# Patient Record
Sex: MALE | Race: WHITE
[De-identification: names, ages, dates, MRNs, and addresses within clinical notes are randomized per-mention and may not be internally consistent; named-entity substitution may affect disease eponyms.]

---

## 2018-09-05 ENCOUNTER — CHARTING TRANS (OUTPATIENT)
Dept: OTHER | Age: 53
End: 2018-09-05

## 2018-09-05 ASSESSMENT — PAIN SCALES - GENERAL: PAINLEVEL_OUTOF10: 4

## 2018-12-08 VITALS
BODY MASS INDEX: 37.56 KG/M2 | TEMPERATURE: 98.2 F | WEIGHT: 283.41 LBS | DIASTOLIC BLOOD PRESSURE: 78 MMHG | HEART RATE: 78 BPM | SYSTOLIC BLOOD PRESSURE: 122 MMHG | HEIGHT: 73 IN

## 2021-02-02 ENCOUNTER — OFFICE VISIT (OUTPATIENT)
Dept: ORTHOPEDICS CLINIC | Facility: CLINIC | Age: 56
End: 2021-02-02
Payer: COMMERCIAL

## 2021-02-02 DIAGNOSIS — M23.306 DEGENERATIVE TEAR OF MENISCUS OF RIGHT KNEE: ICD-10-CM

## 2021-02-02 DIAGNOSIS — S83.209A ACUTE TORN MENISCUS: Primary | ICD-10-CM

## 2021-02-02 DIAGNOSIS — M17.11 PRIMARY OSTEOARTHRITIS OF RIGHT KNEE: ICD-10-CM

## 2021-02-02 PROCEDURE — 99212 OFFICE O/P EST SF 10 MIN: CPT | Performed by: ORTHOPAEDIC SURGERY

## 2021-02-02 NOTE — PROGRESS NOTES
Patient is a 49-year-old white male here for evaluation of his right knee. He has had a diagnosis of a possible medial meniscus tear of his right knee but also some chondromalacia of the medial femoral condyle.   He also has some chondromalacia of the lópez

## 2021-02-10 DIAGNOSIS — Z23 NEED FOR VACCINATION: ICD-10-CM

## 2021-02-11 ENCOUNTER — IMMUNIZATION (OUTPATIENT)
Dept: LAB | Age: 56
End: 2021-02-11
Attending: HOSPITALIST
Payer: COMMERCIAL

## 2021-02-11 DIAGNOSIS — Z23 NEED FOR VACCINATION: Primary | ICD-10-CM

## 2021-02-11 PROCEDURE — 0001A SARSCOV2 VAC 30MCG/0.3ML IM: CPT

## 2021-03-02 ENCOUNTER — OFFICE VISIT (OUTPATIENT)
Dept: ORTHOPEDICS CLINIC | Facility: CLINIC | Age: 56
End: 2021-03-02
Payer: COMMERCIAL

## 2021-03-02 DIAGNOSIS — M94.261 CHONDROMALACIA OF RIGHT KNEE: ICD-10-CM

## 2021-03-02 DIAGNOSIS — S83.241D TEAR OF MEDIAL MENISCUS OF RIGHT KNEE, CURRENT, UNSPECIFIED TEAR TYPE, SUBSEQUENT ENCOUNTER: Primary | ICD-10-CM

## 2021-03-02 DIAGNOSIS — S83.242A OTHER TEAR OF MEDIAL MENISCUS OF LEFT KNEE, UNSPECIFIED WHETHER OLD OR CURRENT TEAR, INITIAL ENCOUNTER: ICD-10-CM

## 2021-03-02 PROCEDURE — 99213 OFFICE O/P EST LOW 20 MIN: CPT | Performed by: ORTHOPAEDIC SURGERY

## 2021-03-02 NOTE — PROGRESS NOTES
Patient is a 68-year-old white male here for follow-up. Patient has had a diagnosis of medial meniscus tear of his knees bilaterally. Also chondromalacia of the medial femoral condyle and patellofemoral joints.   Patient had an MRI scan of his right knee

## 2021-03-04 ENCOUNTER — IMMUNIZATION (OUTPATIENT)
Dept: LAB | Age: 56
End: 2021-03-04
Attending: HOSPITALIST
Payer: COMMERCIAL

## 2021-03-04 DIAGNOSIS — Z23 NEED FOR VACCINATION: Primary | ICD-10-CM

## 2021-03-04 PROCEDURE — 0002A SARSCOV2 VAC 30MCG/0.3ML IM: CPT

## 2021-03-16 ENCOUNTER — OFFICE VISIT (OUTPATIENT)
Dept: ORTHOPEDICS CLINIC | Facility: CLINIC | Age: 56
End: 2021-03-16
Payer: COMMERCIAL

## 2021-03-16 DIAGNOSIS — M17.0 PRIMARY OSTEOARTHRITIS OF BOTH KNEES: Primary | ICD-10-CM

## 2021-03-16 PROCEDURE — 99214 OFFICE O/P EST MOD 30 MIN: CPT | Performed by: ORTHOPAEDIC SURGERY

## 2021-03-16 PROCEDURE — 20610 DRAIN/INJ JOINT/BURSA W/O US: CPT | Performed by: ORTHOPAEDIC SURGERY

## 2021-03-16 RX ORDER — TRIAMCINOLONE ACETONIDE 40 MG/ML
40 INJECTION, SUSPENSION INTRA-ARTICULAR; INTRAMUSCULAR ONCE
Status: COMPLETED | OUTPATIENT
Start: 2021-03-16 | End: 2021-03-16

## 2021-03-16 RX ADMIN — TRIAMCINOLONE ACETONIDE 40 MG: 40 INJECTION, SUSPENSION INTRA-ARTICULAR; INTRAMUSCULAR at 18:26:00

## 2021-03-16 RX ADMIN — TRIAMCINOLONE ACETONIDE 40 MG: 40 INJECTION, SUSPENSION INTRA-ARTICULAR; INTRAMUSCULAR at 18:25:00

## 2021-03-16 NOTE — PROGRESS NOTES
Patient is a 61-year-old white male here for follow-up. He has had a diagnosis of degenerative arthritis of his knees.   I did order MRI scans of his knees to ascertain whether unicondylar arthroplasties would be a consideration and also whether an arthros

## 2021-03-24 ENCOUNTER — TELEPHONE (OUTPATIENT)
Dept: ORTHOPEDICS CLINIC | Facility: CLINIC | Age: 56
End: 2021-03-24

## 2021-03-25 NOTE — TELEPHONE ENCOUNTER
Patient called to just check surgery schedule for his LT Knee Replacement maybe sometime in June of this year. Patient would like a call back. Thanks.     Patient can be reached at [phone]

## 2021-03-26 ENCOUNTER — TELEPHONE (OUTPATIENT)
Dept: ORTHOPEDICS CLINIC | Facility: CLINIC | Age: 56
End: 2021-03-26

## 2021-03-26 NOTE — TELEPHONE ENCOUNTER
Surgeon: Chase De La Rosa  Date of Surgery: 6-14-21  Facility: [facility]       If [facility], scheduling sheet to referral team?: YES  Clearance needed: YES       If yes, requested?: YES  Post-op Appt: 6-29-21 1:40

## 2021-05-04 ENCOUNTER — OFFICE VISIT (OUTPATIENT)
Dept: ORTHOPEDICS CLINIC | Facility: CLINIC | Age: 56
End: 2021-05-04
Payer: COMMERCIAL

## 2021-05-04 VITALS — HEIGHT: 74 IN | WEIGHT: 295 LBS | BODY MASS INDEX: 37.86 KG/M2

## 2021-05-04 DIAGNOSIS — M17.12 PRIMARY OSTEOARTHRITIS OF LEFT KNEE: Primary | ICD-10-CM

## 2021-05-04 PROCEDURE — 99212 OFFICE O/P EST SF 10 MIN: CPT | Performed by: ORTHOPAEDIC SURGERY

## 2021-05-04 PROCEDURE — 20610 DRAIN/INJ JOINT/BURSA W/O US: CPT | Performed by: ORTHOPAEDIC SURGERY

## 2021-05-04 PROCEDURE — 3008F BODY MASS INDEX DOCD: CPT | Performed by: ORTHOPAEDIC SURGERY

## 2021-05-04 RX ORDER — TRIAMCINOLONE ACETONIDE 40 MG/ML
40 INJECTION, SUSPENSION INTRA-ARTICULAR; INTRAMUSCULAR ONCE
Status: COMPLETED | OUTPATIENT
Start: 2021-05-04 | End: 2021-05-04

## 2021-05-04 RX ADMIN — TRIAMCINOLONE ACETONIDE 40 MG: 40 INJECTION, SUSPENSION INTRA-ARTICULAR; INTRAMUSCULAR at 15:09:00

## 2021-05-04 NOTE — PROGRESS NOTES
Patient is a 42-year-old white male here for follow-up. He has had a diagnosis of degenerative arthritis of both knees. He is scheduled for surgery in a little over a month regarding the left knee. That would be a left total knee arthroplasty.   Patient

## 2021-05-27 ENCOUNTER — TELEPHONE (OUTPATIENT)
Dept: ORTHOPEDICS CLINIC | Facility: CLINIC | Age: 56
End: 2021-05-27

## 2021-05-27 DIAGNOSIS — Z96.652 HISTORY OF TOTAL KNEE ARTHROPLASTY, LEFT: Primary | ICD-10-CM

## 2021-05-28 ENCOUNTER — TELEPHONE (OUTPATIENT)
Dept: ORTHOPEDICS CLINIC | Facility: CLINIC | Age: 56
End: 2021-05-28

## 2021-06-01 ENCOUNTER — MED REC SCAN ONLY (OUTPATIENT)
Dept: ORTHOPEDICS CLINIC | Facility: CLINIC | Age: 56
End: 2021-06-01

## 2021-06-08 ENCOUNTER — OFFICE VISIT (OUTPATIENT)
Dept: ORTHOPEDICS CLINIC | Facility: CLINIC | Age: 56
End: 2021-06-08
Payer: COMMERCIAL

## 2021-06-08 VITALS — BODY MASS INDEX: 41.75 KG/M2 | HEIGHT: 73 IN | WEIGHT: 315 LBS

## 2021-06-08 DIAGNOSIS — M17.12 PRIMARY OSTEOARTHRITIS OF LEFT KNEE: Primary | ICD-10-CM

## 2021-06-08 PROCEDURE — 3008F BODY MASS INDEX DOCD: CPT | Performed by: ORTHOPAEDIC SURGERY

## 2021-06-08 PROCEDURE — 99213 OFFICE O/P EST LOW 20 MIN: CPT | Performed by: ORTHOPAEDIC SURGERY

## 2021-06-08 NOTE — PROGRESS NOTES
Patient is a 54-year-old white male here for follow-up. He has had a diagnosis of degenerative arthritis of his knees bilaterally. He has had predominant pain with the left knee. He is scheduled for a total knee arthroplasty next week.   Patient is here

## 2021-06-10 ENCOUNTER — TELEPHONE (OUTPATIENT)
Dept: ORTHOPEDICS CLINIC | Facility: CLINIC | Age: 56
End: 2021-06-10

## 2021-06-10 NOTE — TELEPHONE ENCOUNTER
PT order for S/P left total knee arthroplasty faxed to Tania Hernandez, Nurse  at [facility]. Faxed to [phone]. Fax confirmation received.

## 2021-06-18 ENCOUNTER — TELEPHONE (OUTPATIENT)
Dept: ORTHOPEDICS CLINIC | Facility: CLINIC | Age: 56
End: 2021-06-18

## 2021-06-18 NOTE — TELEPHONE ENCOUNTER
Spoke with patient and notified him that he can resume his vitamins/supplements now since he is post op. Post op Appt confirmed with patient. Pt verbalized understanding. No further questions at this time.

## 2021-06-18 NOTE — TELEPHONE ENCOUNTER
Patient is looking for advise on when he can resume taking his Magnesium and Multivitamin supplments. Please advise.

## 2021-06-23 RX ORDER — HYDROCODONE BITARTRATE AND ACETAMINOPHEN 10; 325 MG/1; MG/1
1 TABLET ORAL EVERY 6 HOURS PRN
Qty: 30 TABLET | Refills: 0 | Status: SHIPPED | OUTPATIENT
Start: 2021-06-23 | End: 2021-07-13

## 2021-06-23 NOTE — TELEPHONE ENCOUNTER
LOV: 6/8/21 Dr Hoa Oropeza  Date of Surgery: 6/14/21  Dispensed Written Strength Quantity Refills Days Supply Provider Pharmacy    HYDROCO/APAP TAB 10-325MG 06/16/2021 06/15/2021  30 tablet  [address] ..        Future Appointments

## 2021-06-29 ENCOUNTER — OFFICE VISIT (OUTPATIENT)
Dept: ORTHOPEDICS CLINIC | Facility: CLINIC | Age: 56
End: 2021-06-29
Payer: COMMERCIAL

## 2021-06-29 VITALS — BODY MASS INDEX: 37.22 KG/M2 | HEIGHT: 74 IN | WEIGHT: 290 LBS

## 2021-06-29 DIAGNOSIS — Z48.89 AFTERCARE FOLLOWING SURGERY: Primary | ICD-10-CM

## 2021-06-29 PROCEDURE — 3008F BODY MASS INDEX DOCD: CPT | Performed by: ORTHOPAEDIC SURGERY

## 2021-06-29 PROCEDURE — 99024 POSTOP FOLLOW-UP VISIT: CPT | Performed by: ORTHOPAEDIC SURGERY

## 2021-06-29 NOTE — PROGRESS NOTES
Patient is a 51-year-old white male here for follow-up. Patient had the left total knee arthroplasty performed about 2 weeks ago. His female  is with him today. Patient states he is overall doing fairly well.   Having some stiffness and soreness

## 2021-06-30 ENCOUNTER — PATIENT MESSAGE (OUTPATIENT)
Dept: ORTHOPEDICS CLINIC | Facility: CLINIC | Age: 56
End: 2021-06-30

## 2021-06-30 ENCOUNTER — TELEPHONE (OUTPATIENT)
Dept: ORTHOPEDICS CLINIC | Facility: CLINIC | Age: 56
End: 2021-06-30

## 2021-06-30 RX ORDER — CEPHALEXIN 500 MG/1
CAPSULE ORAL
Qty: 4 CAPSULE | Refills: 2 | Status: SHIPPED | OUTPATIENT
Start: 2021-06-30

## 2021-06-30 NOTE — TELEPHONE ENCOUNTER
Spoke with patient and notified him that the antibiotic prescription was sent to his pharmacy.  Pt stated he will pick that up before his dental exam. Pt stated when he got home yesterday, one of the steri strips came off of the incision, and the wound star

## 2021-06-30 NOTE — TELEPHONE ENCOUNTER
Ethan Carrera MD 1 minute ago (2:08 PM)   RG  yes a litte discharge yellowish and a little blood.   No fever    Future Appointments   Date Time Provider Roverto Holden   7/13/2021 10:00 AM Lucy Bradley MD [facility]

## 2021-06-30 NOTE — TELEPHONE ENCOUNTER
Patient called us to let us know that he as a dentist appointment on 7/7 for a routine cleaning. Patient would like to know if he needs clearance and antibiotics before going to dentist appointment due to recent surgery. Please advise.

## 2021-07-01 ENCOUNTER — TELEPHONE (OUTPATIENT)
Dept: ORTHOPEDICS CLINIC | Facility: CLINIC | Age: 56
End: 2021-07-01

## 2021-07-01 DIAGNOSIS — Z48.89 AFTERCARE FOLLOWING SURGERY: ICD-10-CM

## 2021-07-01 DIAGNOSIS — T81.49XA WOUND CELLULITIS AFTER SURGERY: Primary | ICD-10-CM

## 2021-07-01 RX ORDER — CEFADROXIL 500 MG/1
500 CAPSULE ORAL 2 TIMES DAILY
Qty: 10 CAPSULE | Refills: 0 | Status: SHIPPED | OUTPATIENT
Start: 2021-07-01 | End: 2021-07-06

## 2021-07-01 NOTE — TELEPHONE ENCOUNTER
Patient informed of MD recommendations below, patient verbalized understanding with intent to comply. Offered opportunity to ask questions, all questions were answered.

## 2021-07-02 NOTE — TELEPHONE ENCOUNTER
Patient with yellow drainage from left knee wound for past day. Denies fever. Now 2 plus weeks after left TKA. Exam shows minimal erythema distal left knee wound. Minimal serous drainage.      Impression is that of mild cellulitis left knee post op left

## 2021-07-13 ENCOUNTER — OFFICE VISIT (OUTPATIENT)
Dept: ORTHOPEDICS CLINIC | Facility: CLINIC | Age: 56
End: 2021-07-13
Payer: COMMERCIAL

## 2021-07-13 DIAGNOSIS — Z96.652 HISTORY OF TOTAL KNEE ARTHROPLASTY, LEFT: ICD-10-CM

## 2021-07-13 DIAGNOSIS — M70.62 TROCHANTERIC BURSITIS OF LEFT HIP: ICD-10-CM

## 2021-07-13 DIAGNOSIS — Z48.89 AFTERCARE FOLLOWING SURGERY: Primary | ICD-10-CM

## 2021-07-13 PROCEDURE — 99024 POSTOP FOLLOW-UP VISIT: CPT | Performed by: ORTHOPAEDIC SURGERY

## 2021-07-13 NOTE — PROGRESS NOTES
Patient is a 60-year-old white male here for follow-up. Patient had the left total knee arthroplasty performed approximately 4 weeks ago. He is here with his wife for follow-up.   Overall he is doing fairly well but complained of some soreness of the late

## 2021-08-11 ENCOUNTER — HOSPITAL ENCOUNTER (OUTPATIENT)
Dept: GENERAL RADIOLOGY | Age: 56
Discharge: HOME | End: 2021-08-11
Attending: ORTHOPAEDIC SURGERY
Payer: COMMERCIAL

## 2021-08-11 DIAGNOSIS — Z01.89 ENCOUNTER FOR LOWER EXTREMITY COMPARISON IMAGING STUDY: ICD-10-CM

## 2021-08-11 DIAGNOSIS — Z48.89 AFTERCARE FOLLOWING SURGERY: ICD-10-CM

## 2021-08-11 DIAGNOSIS — Z96.652 HISTORY OF TOTAL KNEE ARTHROPLASTY, LEFT: ICD-10-CM

## 2021-08-11 PROCEDURE — 73562 X-RAY EXAM OF KNEE 3: CPT | Performed by: ORTHOPAEDIC SURGERY

## 2021-08-11 PROCEDURE — 73560 X-RAY EXAM OF KNEE 1 OR 2: CPT | Performed by: ORTHOPAEDIC SURGERY

## 2021-08-17 ENCOUNTER — OFFICE VISIT (OUTPATIENT)
Dept: ORTHOPEDICS CLINIC | Facility: CLINIC | Age: 56
End: 2021-08-17
Payer: COMMERCIAL

## 2021-08-17 DIAGNOSIS — Z48.89 AFTERCARE FOLLOWING SURGERY: Primary | ICD-10-CM

## 2021-08-17 DIAGNOSIS — M17.11 PRIMARY OSTEOARTHRITIS OF RIGHT KNEE: ICD-10-CM

## 2021-08-17 DIAGNOSIS — Z96.652 HISTORY OF TOTAL KNEE ARTHROPLASTY, LEFT: ICD-10-CM

## 2021-08-17 PROCEDURE — 99024 POSTOP FOLLOW-UP VISIT: CPT | Performed by: ORTHOPAEDIC SURGERY

## 2021-08-17 NOTE — PROGRESS NOTES
Patient is a 24-year-old white male now approximately 8 weeks out from his surgery. He had a left total knee arthroplasty performed. Patient states he still feels weakness post surgery.  Patient states he has some difficulties getting up off the floor or ge

## 2021-08-30 ENCOUNTER — TELEPHONE (OUTPATIENT)
Dept: ORTHOPEDICS CLINIC | Facility: CLINIC | Age: 56
End: 2021-08-30

## 2021-08-30 NOTE — TELEPHONE ENCOUNTER
Patient called requesting to speak with Dr. Marisol Hughes regarding going back to work this Wednesday 9/1/21. Patient states that he received some paperwork releasing him to go back to work , however would like to talk to Dr. Marisol Hughes before he goes back.     Pl

## 2021-09-14 ENCOUNTER — OFFICE VISIT (OUTPATIENT)
Dept: ORTHOPEDICS CLINIC | Facility: CLINIC | Age: 56
End: 2021-09-14
Payer: COMMERCIAL

## 2021-09-14 DIAGNOSIS — Z48.89 AFTERCARE FOLLOWING SURGERY: Primary | ICD-10-CM

## 2021-09-14 DIAGNOSIS — Z96.652 HISTORY OF TOTAL KNEE ARTHROPLASTY, LEFT: ICD-10-CM

## 2021-09-14 PROCEDURE — 99024 POSTOP FOLLOW-UP VISIT: CPT | Performed by: ORTHOPAEDIC SURGERY

## 2021-09-14 NOTE — PROGRESS NOTES
Lift something or withPatient is a 42-year-old white male here for follow-up. He is about 3 months out from his left total knee arthroplasty. Patient's complained of some mild pain of the knee. Patient states he was pivoting he tweaked the knee.   States

## 2021-12-14 ENCOUNTER — OFFICE VISIT (OUTPATIENT)
Dept: ORTHOPEDICS CLINIC | Facility: CLINIC | Age: 56
End: 2021-12-14
Payer: COMMERCIAL

## 2021-12-14 ENCOUNTER — HOSPITAL ENCOUNTER (OUTPATIENT)
Dept: GENERAL RADIOLOGY | Facility: HOSPITAL | Age: 56
Discharge: HOME | End: 2021-12-14
Attending: ORTHOPAEDIC SURGERY
Payer: COMMERCIAL

## 2021-12-14 VITALS — WEIGHT: 295 LBS | BODY MASS INDEX: 37.86 KG/M2 | HEIGHT: 74 IN

## 2021-12-14 DIAGNOSIS — Z96.652 HISTORY OF TOTAL KNEE ARTHROPLASTY, LEFT: Primary | ICD-10-CM

## 2021-12-14 DIAGNOSIS — Z96.652 HISTORY OF TOTAL KNEE ARTHROPLASTY, LEFT: ICD-10-CM

## 2021-12-14 DIAGNOSIS — Z48.89 AFTERCARE FOLLOWING SURGERY: ICD-10-CM

## 2021-12-14 PROCEDURE — 3008F BODY MASS INDEX DOCD: CPT | Performed by: ORTHOPAEDIC SURGERY

## 2021-12-14 PROCEDURE — 73560 X-RAY EXAM OF KNEE 1 OR 2: CPT | Performed by: ORTHOPAEDIC SURGERY

## 2021-12-14 PROCEDURE — 99213 OFFICE O/P EST LOW 20 MIN: CPT | Performed by: ORTHOPAEDIC SURGERY

## 2021-12-14 RX ORDER — BIOTIN 1 MG
1 TABLET ORAL DAILY
COMMUNITY

## 2021-12-14 NOTE — PROGRESS NOTES
Patient is a 19-year-old white male here for follow-up on his left total knee arthroplasty. Patient did have a fall few days ago and injured his left knee. Patient states is feeling better.   Patient's total knee arthroplasty was performed approximately 6

## 2022-06-14 ENCOUNTER — HOSPITAL ENCOUNTER (OUTPATIENT)
Dept: GENERAL RADIOLOGY | Age: 57
Discharge: HOME | End: 2022-06-14
Attending: ORTHOPAEDIC SURGERY
Payer: COMMERCIAL

## 2022-06-14 ENCOUNTER — OFFICE VISIT (OUTPATIENT)
Dept: ORTHOPEDICS CLINIC | Facility: CLINIC | Age: 57
End: 2022-06-14
Payer: COMMERCIAL

## 2022-06-14 VITALS — BODY MASS INDEX: 37.86 KG/M2 | HEIGHT: 74 IN | WEIGHT: 295 LBS

## 2022-06-14 DIAGNOSIS — Z96.652 STATUS POST TOTAL LEFT KNEE REPLACEMENT: ICD-10-CM

## 2022-06-14 DIAGNOSIS — Z96.652 STATUS POST TOTAL LEFT KNEE REPLACEMENT: Primary | ICD-10-CM

## 2022-06-14 PROCEDURE — 99213 OFFICE O/P EST LOW 20 MIN: CPT | Performed by: ORTHOPAEDIC SURGERY

## 2022-06-14 PROCEDURE — 73560 X-RAY EXAM OF KNEE 1 OR 2: CPT | Performed by: ORTHOPAEDIC SURGERY

## 2022-06-14 PROCEDURE — 3008F BODY MASS INDEX DOCD: CPT | Performed by: ORTHOPAEDIC SURGERY

## 2022-06-14 RX ORDER — CEPHALEXIN 500 MG/1
2000 CAPSULE ORAL
Qty: 4 CAPSULE | Refills: 5 | Status: SHIPPED | OUTPATIENT
Start: 2022-06-14 | End: 2022-06-14

## 2022-06-14 RX ORDER — CEPHALEXIN 500 MG/1
500 CAPSULE ORAL 4 TIMES DAILY
Qty: 4 CAPSULE | Refills: 0 | Status: CANCELLED | OUTPATIENT
Start: 2022-06-14

## 2022-06-14 NOTE — PROGRESS NOTES
Patient is a 66-year-old white male here for follow-up on his left knee. Patient had a total knee arthroplasty formed approximately a year ago and has been doing fairly well. The patient notes some numbness of the lateral aspect of his knee and I explained to him that that was normal.  He also complained of some persistent swelling of his left leg which I explained to him is likely due to the postsurgical scarring of the left knee. Patient otherwise is doing well. Patient's exam shows have a nonantalgic gait. Patient has well-healed surgical scar of the left knee. He has 0 at about 120 degrees of range of motion of the knee. Ligaments are stable. Decreased sensation lateral to the incision. Moderate swelling of the left calf compared to mild swelling right calf. No tenderness. No pitting edema. X-rays of the left knee shows the prosthesis to be in good position. No evidence of loosening. Impression is that of stable left total knee arthroplasty. Recommendation is for him to continue with activities to tolerance. Follow-up with me in around 6 months time to check on the swelling.   If not resolved we might consider repeating a vascular exam.

## 2022-12-20 ENCOUNTER — OFFICE VISIT (OUTPATIENT)
Dept: ORTHOPEDICS CLINIC | Facility: CLINIC | Age: 57
End: 2022-12-20
Payer: COMMERCIAL

## 2022-12-20 DIAGNOSIS — I87.2 VENOUS STASIS DERMATITIS OF LEFT LOWER EXTREMITY: ICD-10-CM

## 2022-12-20 DIAGNOSIS — Z96.652 STATUS POST TOTAL LEFT KNEE REPLACEMENT: Primary | ICD-10-CM

## 2022-12-20 DIAGNOSIS — I87.2 VENOUS INSUFFICIENCY OF BOTH LOWER EXTREMITIES: ICD-10-CM

## 2022-12-20 PROCEDURE — 99212 OFFICE O/P EST SF 10 MIN: CPT | Performed by: ORTHOPAEDIC SURGERY

## 2022-12-20 RX ORDER — ERYTHROMYCIN 5 MG/G
OINTMENT OPHTHALMIC
COMMUNITY
Start: 2022-08-29

## 2022-12-20 NOTE — PROGRESS NOTES
Patient is a 80-year-old white male here for follow-up. Patient had a left total knee arthroplasty performed over a year ago. That is been functioning well. He has however complained of some swelling of his left leg but he does get some swelling in his right leg as well. Patient states he has some achiness associated with the swelling. No history of trauma to the left leg recently. He did have some blunt trauma to his right knee when they were moving some stadium seats around and he was hit by the apparatus. Patient however is able to ambulate not affecting his gait at this time regarding the right knee. Patient's exam shows that have mild to moderate swelling of the right leg with hyperpigmentation of the skin consistent with venous stasis dermatitis. Exam of his left leg shows that moderate to moderate plus swelling. No erythema. Also hyperpigmentation associated with the venous stasis. Left knee shows no pain or tenderness with palpation. Ligaments are stable. Patient has full extension and flexion about 120 degrees. Negative calf tenderness. Negative Homans. Impression is that of venous stasis dermatitis and venous insufficiency of the legs bilaterally left greater than right. Second impression is that a stable left total knee arthroplasty. Recommendations are currently observe this for now but he can use compression stockings knee-high. He also should follow-up with me in perhaps 6 months and we will get x-rays of both knees 2 views at that time. I explained to the patient that there is not too much that can be done with the venous insufficiency other than elevation of the legs is much as possible. Compression stockings should be able to help release some. Follow-up as mentioned.

## 2023-06-19 ENCOUNTER — TELEPHONE (OUTPATIENT)
Dept: ORTHOPEDICS CLINIC | Facility: CLINIC | Age: 58
End: 2023-06-19

## 2023-06-20 ENCOUNTER — HOSPITAL ENCOUNTER (OUTPATIENT)
Dept: GENERAL RADIOLOGY | Age: 58
Discharge: HOME | End: 2023-06-20
Attending: ORTHOPAEDIC SURGERY
Payer: COMMERCIAL

## 2023-06-20 ENCOUNTER — OFFICE VISIT (OUTPATIENT)
Dept: ORTHOPEDICS CLINIC | Facility: CLINIC | Age: 58
End: 2023-06-20
Payer: COMMERCIAL

## 2023-06-20 VITALS — BODY MASS INDEX: 37.86 KG/M2 | HEIGHT: 74 IN | WEIGHT: 295 LBS

## 2023-06-20 DIAGNOSIS — Z96.652 STATUS POST TOTAL LEFT KNEE REPLACEMENT: ICD-10-CM

## 2023-06-20 DIAGNOSIS — Z96.652 STATUS POST TOTAL LEFT KNEE REPLACEMENT: Primary | ICD-10-CM

## 2023-06-20 PROCEDURE — 73562 X-RAY EXAM OF KNEE 3: CPT | Performed by: ORTHOPAEDIC SURGERY

## 2023-06-20 PROCEDURE — 99213 OFFICE O/P EST LOW 20 MIN: CPT | Performed by: ORTHOPAEDIC SURGERY

## 2023-06-20 PROCEDURE — 3008F BODY MASS INDEX DOCD: CPT | Performed by: ORTHOPAEDIC SURGERY

## 2023-06-20 NOTE — PROGRESS NOTES
Patient is a 59-year-old white male here for follow-up on his left knee. Patient had a total knee arthroplasty performed about 2 years ago. Patient is doing well with minimal complaints. Patient does have some pain of his right knee intermittently but otherwise is managing fairly well. Patient's exam shows that have a nonantalgic gait today. Negative Trendelenburg gait. Exam of his left knee shows near full extension and flexion to about 120 degrees. Excellent stability to varus and valgus stress. Exam of his left knee shows have a very mild varus alignment. Mild tenderness on the medial joint line. Full extension and flexion to 130 degrees noted. Neurologically intact lower extremities to light touch. Motor exam grossly 5/5. X-rays of his left knee shows the prosthesis to be in good position. No evidence of loosening. Right knee shows moderate to moderately severe degenerative changes particularly of the medial joint line. Impression is that of stable left total knee arthroplasty. Second impression is that of degenerative arthritis of the right knee. Recommendations are to observe both knees for now. He is doing well enough did not feel necessary to consider either cortisone injections or gel injections of the right knee. Regarding the left knee the patient was curious whether he would know if the prosthesis was wearing out on the left knee. I explained to him that typically you would have a somewhat progressive achiness and swelling of the knee over a period of months. Follow-up with me will be as needed at this time.   Patient was advised to come in for a cortisone injection or gel injection of the right knee as he has need

## 2023-10-19 ENCOUNTER — HOSPITAL ENCOUNTER (EMERGENCY)
Facility: HOSPITAL | Age: 58
Discharge: HOME | End: 2023-10-19
Attending: EMERGENCY MEDICINE
Payer: COMMERCIAL

## 2023-10-19 VITALS
DIASTOLIC BLOOD PRESSURE: 87 MMHG | SYSTOLIC BLOOD PRESSURE: 122 MMHG | TEMPERATURE: 98 F | OXYGEN SATURATION: 94 % | WEIGHT: 300 LBS | BODY MASS INDEX: 38.5 KG/M2 | HEIGHT: 74 IN | RESPIRATION RATE: 16 BRPM | HEART RATE: 78 BPM

## 2023-10-19 DIAGNOSIS — R04.0 EPISTAXIS: Primary | ICD-10-CM

## 2023-10-19 PROCEDURE — 99283 EMERGENCY DEPT VISIT LOW MDM: CPT

## 2023-10-19 PROCEDURE — 30901 CONTROL OF NOSEBLEED: CPT

## 2023-10-19 RX ORDER — TRANEXAMIC ACID 100 MG/ML
5 INJECTION, SOLUTION INTRAVENOUS ONCE
Status: COMPLETED | OUTPATIENT
Start: 2023-10-19 | End: 2023-10-19

## 2023-10-19 NOTE — ED INITIAL ASSESSMENT (HPI)
Pt present to ED for c/o nosebleed x10 minutes, large clots. Nose clamp placed. Actively bleeding since 1pm. Hx nose bleeding as child. No thinners.

## 2023-10-19 NOTE — DISCHARGE INSTRUCTIONS
Use nasal saline spray several times a day in both sides of your nose  Use humidifier in your bedroom  Apply thin layer of Aquaphor ointment to the inside of your nose twice daily with a Q-tip  Return if worse  Avoid aspirin  Follow-up with your primary care physician 2 to 3 days  Follow-up with ENT, as needed.   Avoid aspirin, or NSAIDs Tylenol for pain  No heavy lifting for 24 hours  Do not bend over  Sneeze with open mouth

## 2024-07-11 ENCOUNTER — TELEPHONE (OUTPATIENT)
Dept: ORTHOPEDICS CLINIC | Facility: CLINIC | Age: 59
End: 2024-07-11

## 2024-07-11 NOTE — TELEPHONE ENCOUNTER
Patient called to schedule visit with Dr. Tam.    He is a previously surgical patient of Dr. Ricci. He had a left knee TKA back in June 2021 and would like both knees checked out.    No recent xrays on file; please enter bilateral knee xrays.    Advised patient to arrive 30 min early for xrays on-site at [facility].

## 2024-07-11 NOTE — TELEPHONE ENCOUNTER
This patient would be more appropriate for Dr. Da Silva or Johnnie. Dr. Tam does not see joints that have previously been replaced.     Hx. Left knee replacement    Future Appointments   Date Time Provider Department Center   8/21/2024  9:00 AM Zarina Tam [id number]

## 2024-07-11 NOTE — TELEPHONE ENCOUNTER
Pt accepted appt with Dr. Da Silva for roly knee pain; former pt of Dr. Espinoza's. Please advise if imaging is needed.  Future Appointments   Date Time Provider Department Center   8/21/2024  9:00 AM [id number]   9/6/2024  9:40 AM Reyes Da Silva MD [facility]

## 2024-09-06 ENCOUNTER — OFFICE VISIT (OUTPATIENT)
Dept: ORTHOPEDICS CLINIC | Facility: CLINIC | Age: 59
End: 2024-09-06
Payer: COMMERCIAL

## 2024-09-06 ENCOUNTER — HOSPITAL ENCOUNTER (OUTPATIENT)
Dept: GENERAL RADIOLOGY | Age: 59
Discharge: HOME | End: 2024-09-06
Attending: ORTHOPAEDIC SURGERY
Payer: COMMERCIAL

## 2024-09-06 VITALS — HEIGHT: 73.5 IN | WEIGHT: 315 LBS | BODY MASS INDEX: 40.86 KG/M2

## 2024-09-06 DIAGNOSIS — Z96.652 STATUS POST TOTAL LEFT KNEE REPLACEMENT: Primary | ICD-10-CM

## 2024-09-06 DIAGNOSIS — M17.11 PRIMARY OSTEOARTHRITIS OF RIGHT KNEE: ICD-10-CM

## 2024-09-06 DIAGNOSIS — E66.01 MORBID OBESITY (HCC): ICD-10-CM

## 2024-09-06 DIAGNOSIS — Z96.652 STATUS POST TOTAL LEFT KNEE REPLACEMENT: ICD-10-CM

## 2024-09-06 PROBLEM — S05.01XA CORNEA ABRASION, RIGHT, INITIAL ENCOUNTER: Status: ACTIVE | Noted: 2022-08-29

## 2024-09-06 PROBLEM — M17.12 PRIMARY OSTEOARTHRITIS OF LEFT KNEE: Status: ACTIVE | Noted: 2021-06-15

## 2024-09-06 PROCEDURE — 73562 X-RAY EXAM OF KNEE 3: CPT | Performed by: ORTHOPAEDIC SURGERY

## 2024-09-06 PROCEDURE — 99214 OFFICE O/P EST MOD 30 MIN: CPT | Performed by: ORTHOPAEDIC SURGERY

## 2024-09-06 PROCEDURE — 3008F BODY MASS INDEX DOCD: CPT | Performed by: ORTHOPAEDIC SURGERY

## 2024-09-06 RX ORDER — RIVAROXABAN 15 MG/1
15 TABLET, FILM COATED ORAL 2 TIMES DAILY WITH MEALS
COMMUNITY
Start: 2024-08-10

## 2024-09-06 NOTE — H&P
[facility] New Patient Note    CC:   Chief Complaint   Patient presents with    Knee Pain     Bilateral knee pain left knee replaced 3 years ago        HPI: This 59 year old male presents today with complaints of right knee pain, as well as check up for left knee replacement.  He had his left knee replaced a few years ago by Dr. Espinoza at [facility].  He reports that the knee is doing well for the most part.  He does recall the recovery, notes that it was difficult at first, but he does attempt to avoid any pain medications.  He notes that the knee is doing well, but does have some questions about numbness and clicking.  He states that the right knee is also bothersome, pain along the inside/medial aspect, and occasional zings of pain with twisting.  He notes that it is not as bad as his left knee was prior to replacement.  He does state that he has done injections a few times in the past, steroid, and this has provided relief.  He attempts to avoid medications if not needed.  He does have some questions about other injections for the knee.  Of note he reports recent history of superficial blood clot after a plane flight 1 month ago.  He has subsequently come off blood thinner.  He does note chronic swelling to the legs, little bit worse on the left, worse after a long day standing.    History reviewed. No pertinent past medical history.  Past Surgical History:   Procedure Laterality Date    Knee replacement surgery      Repair ing hernia,5+y/o,reducibl      Skin surgery       Current Outpatient Medications   Medication Sig Dispense Refill    Cholecalciferol (VITAMIN D3) 25 MCG (1000 UT) Oral Cap Take 1 tablet by mouth daily.      Magnesium 125 MG Oral Cap Take 150 mg by mouth.        Multivitamin Chewtab, ADULT, Oral Chew Tab Chew 1 tablet by mouth daily.      XARELTO 15 MG Oral Tab Take 1 tablet (15 mg total) by mouth 2 (two) times daily with meals. (Patient not taking: Reported on 9/6/2024)      erythromycin 5  MG/GM Ophthalmic Ointment  (Patient not taking: Reported on 9/6/2024)      cephALEXin (KEFLEX) 500 MG Oral Cap Take 4 capsules by mouth one hour prior to dental procedure (Patient not taking: Reported on 9/6/2024) 4 capsule 2     Allergies   Allergen Reactions    Menthol OTHER (SEE COMMENTS) and SWELLING     Swelling and burning  Swelling and burning  Swelling and burning     Family History   Problem Relation Age of Onset    Diabetes Father      Social History     Occupational History    Not on file   Tobacco Use    Smoking status: Never    Smokeless tobacco: Never    Tobacco comments:     Updated 9/6/24   Vaping Use    Vaping status: Never Used   Substance and Sexual Activity    Alcohol use: Not Currently    Drug use: Never    Sexual activity: Not on file        ROS:  Detailed system review obtained and negative except as mentioned above      Physical Exam:    Ht 6' 1.5\" (1.867 m)   Wt (!) 369 lb 3.2 oz (167.5 kg)   BMI 48.05 kg/m²   Constitutional: Awake, alert, no distress.  Very pleasant  Psychological: Appropriate affect.  Respiratory: Unlabored breathing.  Bilateral lower extremity:  Inspection: skin intact, venous stasis changes to the lower extremities  Palpation: No focal tenderness about the left knee, mild tenderness right knee medial joint line  Range of motion: Extension of left knee is full, right knee just short of full; flexion of the left knee is 110, right knee 90  Left knee stable to varus and valgus stress throughout range of motion  Neuromuscular: 5 out of 5 quad strength, sensation intact  Vascular: Warm well-perfused      Imaging: X-rays of the left knee from today personally viewed, independently interpreted and radiology report read.  Demonstrates hybrid left total knee arthroplasty, Chris.  AP view suboptimal, unable to visualize under tibial baseplate adequately, however no highly concerning findings for implant failure, in particular with excellent lateral view that demonstrates no  lucencies at bone implant interface on the femur or bone cement implant interface on the tibia.  [place] view with patella tracking centrally.  These were compared to films from 1 year ago June 2023 that demonstrate no definite changes.    Right knee x-rays from June 2023 reviewed, demonstrates complete loss of medial joint space/bone-on-bone medial compartment.      Labs: Hemoglobin A1c from August 23, 2024 was 6.7      Assessment/Plan:  Diagnoses and all orders for this visit:    Status post total left knee replacement    Primary osteoarthritis of right knee    Morbid obesity (HCC)      Assessment: 59-year-old male with intermittently symptomatic severe right knee osteoarthritis, also check up for left knee replacement.    Plan: From the left knee standpoint, counseled the patient on expectations following surgery.  Reassured him with regard to his reports of numbness on the lateral side of the knee as well as occasional clicking.  He is doing well, will continue activities as tolerated and advised repeat x-rays in 5 years.  From the right knee standpoint, I discussed the etiology, natural history, and management options for symptomatic knee osteoarthritis.  I discussed nonsurgical and surgical treatments, with nonsurgical treatments to include anti-inflammatory medications, injections, activity modification, weight loss, low impact exercise and possible therapy.  Surgery would be with knee replacement and is an elective operation reserved for when nonsurgical treatments no longer alleviate symptoms sufficiently.  I did discuss some of the significantly increased risks with body mass index in the morbidly obese range.  He does report that he is working on this and recently joined up at the [employer] to exercise.  He does report some weight loss recently.  He feels at this time that his symptoms are not too significant that he does not need to consider injection, and he would like to hold off on surgery for as long as  possible.  We did discuss the use of over-the-counter NSAID such as Aleve.  He will contact us if he would like to proceed with injection appointment.  I did discuss Biologics, Visco and steroid.  Discussed the evidence regarding these various injections.  He may contact us for standard steroid injection, or Zilretta or Visco.    Reyes Da Silva MD, FAAOS  [facility] Orthopaedic Surgery  Phone [phone]  Fax [phone]

## (undated) NOTE — LETTER
08/17/21      To whom it may concern:      Samantha Cabrera can return to work with no restrictions on 9-1-21.           Dx: S/P left total knee arthroplasty                                                                                                Erwin Alan MD